# Patient Record
Sex: FEMALE | Race: BLACK OR AFRICAN AMERICAN | Employment: FULL TIME | URBAN - METROPOLITAN AREA
[De-identification: names, ages, dates, MRNs, and addresses within clinical notes are randomized per-mention and may not be internally consistent; named-entity substitution may affect disease eponyms.]

---

## 2020-05-08 ENCOUNTER — HOSPITAL ENCOUNTER (EMERGENCY)
Age: 55
Discharge: HOME | End: 2020-05-08
Attending: EMERGENCY MEDICINE
Payer: COMMERCIAL

## 2020-05-08 VITALS
RESPIRATION RATE: 15 BRPM | OXYGEN SATURATION: 100 % | HEART RATE: 99 BPM | DIASTOLIC BLOOD PRESSURE: 109 MMHG | TEMPERATURE: 97.1 F | SYSTOLIC BLOOD PRESSURE: 189 MMHG

## 2020-05-08 DIAGNOSIS — S46.911A STRAIN OF ACROMIOCLAVICULAR JOINT, RIGHT, INITIAL ENCOUNTER: Primary | ICD-10-CM

## 2020-05-08 PROCEDURE — 74011250637 HC RX REV CODE- 250/637: Performed by: EMERGENCY MEDICINE

## 2020-05-08 PROCEDURE — 99283 EMERGENCY DEPT VISIT LOW MDM: CPT

## 2020-05-08 RX ORDER — IBUPROFEN 600 MG/1
600 TABLET ORAL ONCE
Status: COMPLETED | OUTPATIENT
Start: 2020-05-08 | End: 2020-05-08

## 2020-05-08 RX ORDER — NAPROXEN 500 MG/1
500 TABLET ORAL 2 TIMES DAILY WITH MEALS
Qty: 20 TAB | Refills: 0 | Status: SHIPPED | OUTPATIENT
Start: 2020-05-08 | End: 2020-05-18

## 2020-05-08 RX ADMIN — IBUPROFEN 600 MG: 600 TABLET, FILM COATED ORAL at 20:58

## 2020-05-08 NOTE — LETTER
NOTIFICATION RETURN TO WORK / SCHOOL 
 
5/8/2020 8:52 PM 
 
Ms. Rico Fabian [address] [place] [id number] To Whom It May Concern: Rico Fabian is currently under the care of [facility] EMERGENCY DEPT. She will return to work/school on: 5/11/2020 If there are questions or concerns please have the patient contact her doctor's office.  
 
 
 
Sincerely, 
 
 
Lety Freeman MD

## 2020-05-09 NOTE — ED PROVIDER NOTES
[address] [facility]  EMERGENCY DEPARTMENT HISTORY AND PHYSICAL EXAM       Date: 5/8/2020   Patient Name: Jim Guevara   YOB: 1965  Medical Record Number: [id number]    HISTORY OF PRESENTING ILLNESS:     Jim Gibsoner is a 53 y.o. female presenting with the noted PMH to the ED c/o right shoulder pain. Patient states she was at work when it started. 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 She states that she felt a pull in her right shoulder area. This happened at 1800 this evening. She states is progressively getting worse. Has not taken any pain medicine for it. She is right-handed. She states is worse with certain positions feeling better with ice. Denies any chest pain or shortness of breath. She does report some nasal congestion from her seasonal allergies. Does feel postnasal drip. Denies any sore throat. Denies any cough. Denies any headache. Denies any neck or back pain. Denies any urine or bowel changes. Rest of 10 systems reviewed and negative. Primary Care Provider: Fabrice Welch MD   Specialist:    Past Medical History:   No past medical history on file. Past Surgical History:   No past surgical history on file. Social History:   Social History     Tobacco Use    Smoking status: Not on file   Substance Use Topics    Alcohol use: Not on file    Drug use: Not on file        Allergies:   No Known Allergies     REVIEW OF SYSTEMS:  Review of Systems      PHYSICAL EXAM:  Vitals:    05/08/20 2024   BP: (!) 189/109   Pulse: 99   Resp: 15   Temp: 97.1 °F (36.2 °C)   SpO2: 100%       Physical Exam   Vital signs reviewed. Alert oriented x 3 in NAD. HEENT: normocephalic atraumatic. Eyes are PERRLA EOMI. Conjunctiva normal.    External ears and nose normal.    Neck: normal external exam. No midline neck or back TTP. Lungs are clear to ascultation bilaterally.   normal effort  Heart is regular rate and rhythm with no murmurs. Abdomen soft and nontender. No rebound rigidity or guarding. Extremities: Moves all 4 extremities and no distress. Full range of motion. 2+ pulses and BCR in all 4 extremities. TTP over her right AC joint. No step-off or crepitus. Flexion and extension intact. Radial median and ulnar nerves intact. Neuro: Normal gait. 5 out of 5 strength in all 4 extremities. No facial droop. Skin examination: intact. no rashes. No petechia or purpura. Medications   ibuprofen (MOTRIN) tablet 600 mg (has no administration in time range)       RESULTS:    Labs -   Labs Reviewed - No data to display    Radiologic Studies -  No results found. MEDICAL DECISION MAKING    No evidence of any fracture or dislocation. Suspect more AC strain. Rice precautions explained. Patient will call her primary doctor on Monday to get rechecked or come back if symptoms change or worsen. Patient denies falls or injury to suggest needing x-ray. Neurologically intact distally. Results and precautions explained. Patient states understanding and agrees with plan. No evidence of compartment syndrome. No evidence of DVT PE. No evidence of septic joint. Patient declines any narcotic pain medicine. States she does not like to take pills. Patient has no new complaints, changes, or physical findings. Results were reviewed with the patient. Pt's questions and concerns were addressed. Care plan was outlined, including follow-up with PCP/specialist and return precautions were discussed. Patient is felt to be stable for discharge at this time. Diagnosis   Clinical Impression:   1.  Strain of acromioclavicular joint, right, initial encounter           Follow-up Information     Follow up With Specialties Details Why Contact Info    Doc MD [facility] Practice Call in 2 days  [address] [address] [phone]            Current Discharge Medication List START taking these medications    Details   naproxen (Naprosyn) 500 mg tablet Take 1 Tab by mouth two (2) times daily (with meals) for 10 days. As needed for pain  Qty: 20 Tab, Refills: 0             discharged in stable and improved condition. This chart was completed using Dragon, a dictation transcription service. Errors may have resulted from using this device.

## 2020-05-09 NOTE — DISCHARGE INSTRUCTIONS
Thank you so much for visiting us today. Please make sure to call your doctor Monday to be rechecked in 1-3 days. Bring your results from here with you when you do. Return immediately if any fevers, headaches, chest pain, difficulty breathing, abdominal pain, worsening or changing symptoms, or any other concerns. Patient Education        Shoulder Sprain: Care Instructions  Your Care Instructions    A shoulder sprain occurs when you stretch or tear a ligament in your shoulder. Ligaments are tough tissues that connect one bone to another. A sprain can happen during sports, a fall, or projects around the house. Shoulder sprains usually get better with treatment at home. Follow-up care is a key part of your treatment and safety. Be sure to make and go to all appointments, and call your doctor if you are having problems. It's also a good idea to know your test results and keep a list of the medicines you take. How can you care for yourself at home? · Rest and protect your shoulder. Try to stop or reduce any action that causes pain. · If your doctor gave you a sling or immobilizer, wear it as directed. A sling or immobilizer supports your shoulder and may make you more comfortable. · Put ice or a cold pack on your shoulder for 10 to 20 minutes at a time. Try to do this every 1 to 2 hours for the next 3 days (when you are awake) or until the swelling goes down. Put a thin cloth between the ice and your skin. Some doctors suggest alternating between hot and cold. · Be safe with medicines. Read and follow all instructions on the label. ? If the doctor gave you a prescription medicine for pain, take it as prescribed. ? If you are not taking a prescription pain medicine, ask your doctor if you can take an over-the-counter medicine. · For the first day or two after an injury, avoid things that might increase swelling, such as hot showers, hot tubs, or hot packs.   · After 2 or 3 days, if your swelling is gone, apply a heating pad set on low or a warm cloth to your shoulder. This helps keep your shoulder flexible. Some doctors suggest that you go back and forth between hot and cold. Put a thin cloth between the heating pad and your skin. · Follow your doctor's or physical therapist's directions for exercises. · Return to your usual level of activity slowly. When should you call for help? Call your doctor now or seek immediate medical care if:    · Your pain is worse.     · You cannot move your shoulder.     · Your arm is cool or pale or changes color below the shoulder.     · You have tingling, weakness, or numbness in your arm.    Watch closely for changes in your health, and be sure to contact your doctor if:    · You do not get better as expected. Where can you learn more? Go to http://kashif-eugene.info/  Enter I888 in the search box to learn more about \"Shoulder Sprain: Care Instructions. \"  Current as of: June 26, 2019Content Version: 12.4  © 6766-3758 [employer]. Care instructions adapted under license by [employer] (which disclaims liability or warranty for this information). If you have questions about a medical condition or this instruction, always ask your healthcare professional. [address] any warranty or liability for your use of this information.

## 2020-05-09 NOTE — ED TRIAGE NOTES
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